# Patient Record
Sex: MALE | Race: WHITE | NOT HISPANIC OR LATINO | Employment: OTHER | URBAN - METROPOLITAN AREA
[De-identification: names, ages, dates, MRNs, and addresses within clinical notes are randomized per-mention and may not be internally consistent; named-entity substitution may affect disease eponyms.]

---

## 2020-06-13 PROBLEM — F10.20 ALCOHOLIC (HCC): Status: ACTIVE | Noted: 2020-06-13

## 2020-06-13 PROBLEM — R41.0 CONFUSION: Status: ACTIVE | Noted: 2020-06-13

## 2020-06-18 PROBLEM — R41.0 CONFUSION: Status: RESOLVED | Noted: 2020-06-13 | Resolved: 2020-06-18

## 2020-06-18 PROBLEM — F10.20 ALCOHOLIC (HCC): Status: RESOLVED | Noted: 2020-06-13 | Resolved: 2020-06-18

## 2020-07-20 ENCOUNTER — HOSPITAL ENCOUNTER (EMERGENCY)
Facility: MEDICAL CENTER | Age: 70
End: 2020-07-20
Attending: EMERGENCY MEDICINE

## 2020-07-20 VITALS
BODY MASS INDEX: 35.93 KG/M2 | HEART RATE: 97 BPM | HEIGHT: 74 IN | DIASTOLIC BLOOD PRESSURE: 80 MMHG | SYSTOLIC BLOOD PRESSURE: 155 MMHG | WEIGHT: 279.98 LBS | RESPIRATION RATE: 18 BRPM | OXYGEN SATURATION: 93 % | TEMPERATURE: 99 F

## 2020-07-20 DIAGNOSIS — R79.81 LOW OXYGEN SATURATION: ICD-10-CM

## 2020-07-20 DIAGNOSIS — F10.10 ALCOHOL ABUSE: ICD-10-CM

## 2020-07-20 PROCEDURE — 302449 STATCHG TRIAGE ONLY (STATISTIC)

## 2020-07-20 PROCEDURE — 36415 COLL VENOUS BLD VENIPUNCTURE: CPT

## 2020-07-20 ASSESSMENT — LIFESTYLE VARIABLES
TOTAL SCORE: 4
HAVE YOU EVER FELT YOU SHOULD CUT DOWN ON YOUR DRINKING: YES
HAVE PEOPLE ANNOYED YOU BY CRITICIZING YOUR DRINKING: YES
EVER FELT BAD OR GUILTY ABOUT YOUR DRINKING: YES
TOTAL SCORE: 4
TOTAL SCORE: 4
DO YOU DRINK ALCOHOL: YES
EVER HAD A DRINK FIRST THING IN THE MORNING TO STEADY YOUR NERVES TO GET RID OF A HANGOVER: YES
CONSUMPTION TOTAL: INCOMPLETE
DOES PATIENT WANT TO STOP DRINKING: YES

## 2020-07-20 ASSESSMENT — FIBROSIS 4 INDEX: FIB4 SCORE: 2.79

## 2020-07-20 NOTE — ED TRIAGE NOTES
pt to ED07 from [facility]. pt was sent there for detox and found to have O2 sat 89% they would not accept him and called EMS. pt VSS upon arival O2 sat 93% RA. pt c/o slight HA and nausea. Pt states his last dink was Friday and usually drinks 1pint/day

## 2020-07-20 NOTE — ED NOTES
Pt stating to RN that he does not want to be here and does not want anything done. Pt states he doesn't want to go back to [facility]. Pt agreeable to wait for EDP before leaving [place]

## 2020-07-20 NOTE — DISCHARGE INSTRUCTIONS
As we discussed, you have elected to leave AGAINST MEDICAL ADVICE.  You may return to the emergency department at any time to continue your evaluation, and you are strongly encouraged to do so.  As we discussed, your oxygen level is low, if it dips much lower it could become life-threatening.  For this reason is very important that you return immediately if you develop any new or worsening symptoms, this includes fevers, shortness of breath, lightheadedness, weakness, or if you have any further concerns.

## 2020-07-26 NOTE — ED PROVIDER NOTES
Another patient was incorrectly triaged under this MRN. This patient was not seen in the ED.    Monica Rivera MD  7/26/20 7:37 AM